# Patient Record
Sex: FEMALE | Race: WHITE | Employment: STUDENT | URBAN - METROPOLITAN AREA
[De-identification: names, ages, dates, MRNs, and addresses within clinical notes are randomized per-mention and may not be internally consistent; named-entity substitution may affect disease eponyms.]

---

## 2017-03-05 ENCOUNTER — HOSPITAL ENCOUNTER (OUTPATIENT)
Age: 22
Discharge: HOME | End: 2017-03-05
Attending: EMERGENCY MEDICINE
Payer: COMMERCIAL

## 2017-03-05 VITALS
HEART RATE: 85 BPM | TEMPERATURE: 99 F | DIASTOLIC BLOOD PRESSURE: 70 MMHG | WEIGHT: 130 LBS | SYSTOLIC BLOOD PRESSURE: 108 MMHG | BODY MASS INDEX: 20.4 KG/M2 | RESPIRATION RATE: 16 BRPM | OXYGEN SATURATION: 98 % | HEIGHT: 67 IN

## 2017-03-05 DIAGNOSIS — N30.00 ACUTE CYSTITIS WITHOUT HEMATURIA: Primary | ICD-10-CM

## 2017-03-05 LAB
B-HCG UR QL: NEGATIVE
URINE BILIRUBIN: NEGATIVE
URINE BLOOD: NEGATIVE
URINE COLOR: YELLOW
URINE GLUCOSE: NEGATIVE MG/DL
URINE KETONES: NEGATIVE MG/DL
URINE NITRITE: NEGATIVE
URINE PH: 6.5
URINE PROTEIN: NEGATIVE MG /DL
URINE SPECIFIC GRAVITY: >=1.03
URINE UROBILINOGEN: 1 MG/DL

## 2017-03-05 PROCEDURE — 87186 SC STD MICRODIL/AGAR DIL: CPT | Performed by: EMERGENCY MEDICINE

## 2017-03-05 PROCEDURE — 87088 URINE BACTERIA CULTURE: CPT | Performed by: EMERGENCY MEDICINE

## 2017-03-05 PROCEDURE — 99203 OFFICE O/P NEW LOW 30 MIN: CPT

## 2017-03-05 PROCEDURE — 81025 URINE PREGNANCY TEST: CPT

## 2017-03-05 PROCEDURE — 99204 OFFICE O/P NEW MOD 45 MIN: CPT

## 2017-03-05 PROCEDURE — 87086 URINE CULTURE/COLONY COUNT: CPT | Performed by: EMERGENCY MEDICINE

## 2017-03-05 RX ORDER — SULFAMETHOXAZOLE AND TRIMETHOPRIM 800; 160 MG/1; MG/1
1 TABLET ORAL 2 TIMES DAILY
Qty: 6 TABLET | Refills: 0 | Status: SHIPPED | OUTPATIENT
Start: 2017-03-05 | End: 2017-03-08

## 2017-03-05 NOTE — ED PROVIDER NOTES
Patient Seen in: [facility] In [address] [place] [address]    History   Patient presents with:  Urinary Symptoms (urologic)    Stated Complaint: POSSIBLE UTI    HPI   Patient is a 79-year-old female who was a history of recurrent urinary tract infecti nondistended  Skin: No rash    ED Course     Labs Reviewed   [facility] POCT URINALYSIS DIPSTICK MANUAL - Abnormal; Notable for the following:     Urine Clarity Cloudy (*)     Leukocyte esterase urine Small (*)     All other components within normal limits   [facility]

## 2017-03-05 NOTE — ED INITIAL ASSESSMENT (HPI)
Started with UTI symptoms this morning. Has a hx of frequent UTI's. Painful urination, frequency, urgency. Denies any bleeding/fevers. States she gets UTI's after sex.

## (undated) NOTE — ED AVS SNAPSHOT
[facility] in [facility] [address]  [address] [facility]    Phone:  [phone]    Fax:  [phone]           Johnny Hernandezfinkel   MRN: [id number]    Department:  [facility] in [address] [place] [address]   Date of Visit: under your health insurance plan. Please contact your insurance company and physician's office to determine coverage and benefits available for follow-up care and referrals.      It is our goal to assure that you are completely satisfied with every aspect doctor until you can check with your doctor. Please bring the medication list to your next doctor's appointment. Any imaging studies and labs completed today can be reviewed in your [employer] account.   You may have had testing done that requires us to co Medicaid plans. To get signed up and covered, please call [phone] and ask to get set up for an insurance coverage that is in-network with [facility]lia [facility]ios.         [employer]     Call the [employer] for assistance with your inactive [facility] account